# Patient Record
(demographics unavailable — no encounter records)

---

## 2024-10-17 NOTE — PHYSICAL EXAM
[Alert] : alert [Flat Open Anterior [place]] : flat open anterior fontanelle [Conjunctivae with no discharge] : conjunctivae with discharge [Clear Tympanic membranes] : clear tympanic membranes [Tooth Eruption] : tooth eruption [Clear to Auscultation Bilaterally] : clear to auscultation bilaterally [Regular Rate and Rhythm] : regular rate and rhythm [S1, S2 present] : S1, S2 present [Murmurs] : no murmurs [Soft] : soft [Normal External Genitalia] : normal external genitalia [Allis Sign] : negative Allis sign

## 2024-10-17 NOTE — HISTORY OF PRESENT ILLNESS
[Mother] : mother [Formula ___ oz/feed] : [unfilled] oz of formula per feed [Normal] : Normal [Wakes up at night] : wakes up at night [Sippy Cup use] : sippy cup use [de-identified] : Flu#1 [FreeTextEntry1] : 9 m/o F here for well visit.

## 2024-10-17 NOTE — DISCUSSION/SUMMARY
[FreeTextEntry1] : - discussed family's questions and concerns - growth percentiles wnl -  development appropriate for age - can follow up in 3mos for next well visit

## 2024-10-17 NOTE — PHYSICAL EXAM
[Alert] : alert [Flat Open Anterior [place]] : flat open anterior fontanelle [Conjunctivae with no discharge] : conjunctivae with discharge [Clear Tympanic membranes] : clear tympanic membranes [Tooth Eruption] : tooth eruption [Clear to Auscultation Bilaterally] : clear to auscultation bilaterally [Regular Rate and Rhythm] : regular rate and rhythm [S1, S2 present] : S1, S2 present [Murmurs] : no murmurs [Soft] : soft [Normal External Genitalia] : normal external genitalia [Allis Sign] : negative Allis sign

## 2024-10-17 NOTE — HISTORY OF PRESENT ILLNESS
[Mother] : mother [Formula ___ oz/feed] : [unfilled] oz of formula per feed [Normal] : Normal [Wakes up at night] : wakes up at night [Sippy Cup use] : sippy cup use [de-identified] : Flu#1 [FreeTextEntry1] : 9 m/o F here for well visit.

## 2025-01-09 NOTE — DISCUSSION/SUMMARY
[Feeding and Appetite Changes] : feeding and appetite changes [] : The components of the vaccine(s) to be administered today are listed in the plan of care. The disease(s) for which the vaccine(s) are intended to prevent and the risks have been discussed with the caretaker.  The risks are also included in the appropriate vaccination information statements which have been provided to the patient's caregiver.  The caregiver has given consent to vaccinate. [FreeTextEntry1] : - discussed family's questions and concerns - growth percentiles __ - development appropriate for age - Hgb and lead ___ - GoCheck vision screen ___ - can follow up in 3mos for next well visit

## 2025-01-21 NOTE — HISTORY OF PRESENT ILLNESS
[Cow's milk ___ oz/feed] : [unfilled] oz of Cow's milk per feed [Table food] : table food [Normal] : Normal [Mother] : mother [Sippy cup use] : Sippy cup use [de-identified] : mixing Enfamil, whole milk  [de-identified] : MMR, Mery [FreeTextEntry1] : 12 m/o F here for well visit.

## 2025-01-21 NOTE — PHYSICAL EXAM
[Alert] : alert [Normocephalic] : normocephalic [Conjunctivae with no discharge] : conjunctivae with no discharge [Clear Tympanic membranes] : clear tympanic membranes [Tooth Eruption] : tooth eruption [Clear to Auscultation Bilaterally] : clear to auscultation bilaterally [Regular Rate and Rhythm] : regular rate and rhythm [S1, S2 present] : S1, S2 present [Soft] : soft [Normal External Genitalia] : normal external genitalia [Allis Sign] : negative Allis sign [de-identified] : 2/6 systolic murmur

## 2025-01-21 NOTE — PHYSICAL EXAM
[Alert] : alert [Normocephalic] : normocephalic [Conjunctivae with no discharge] : conjunctivae with no discharge [Clear Tympanic membranes] : clear tympanic membranes [Tooth Eruption] : tooth eruption [Clear to Auscultation Bilaterally] : clear to auscultation bilaterally [Regular Rate and Rhythm] : regular rate and rhythm [S1, S2 present] : S1, S2 present [Soft] : soft [Normal External Genitalia] : normal external genitalia [Allis Sign] : negative Allis sign [de-identified] : 2/6 systolic murmur

## 2025-01-21 NOTE — HISTORY OF PRESENT ILLNESS
[Cow's milk ___ oz/feed] : [unfilled] oz of Cow's milk per feed [Table food] : table food [Normal] : Normal [Mother] : mother [Sippy cup use] : Sippy cup use [de-identified] : mixing Enfamil, whole milk  [de-identified] : MMR, Mery [FreeTextEntry1] : 12 m/o F here for well visit.

## 2025-01-21 NOTE — DISCUSSION/SUMMARY
[FreeTextEntry1] : - discussed family's questions and concerns - growth percentiles wnl - development appropriate for age - Hgb and lead wnl - GoCheck vision screen passed - can follow up in 3mos for next well visit

## 2025-04-03 NOTE — DISCUSSION/SUMMARY
[Communication and Social Development] : communication and social development [] : The components of the vaccine(s) to be administered today are listed in the plan of care. The disease(s) for which the vaccine(s) are intended to prevent and the risks have been discussed with the caretaker.  The risks are also included in the appropriate vaccination information statements which have been provided to the patient's caregiver.  The caregiver has given consent to vaccinate. [FreeTextEntry1] : - discussed family's questions and concerns - growth percentiles __ - development appropriate for age - can follow up in 3mos for next well visit

## 2025-05-13 NOTE — PHYSICAL EXAM
[Erythema] : erythema [NL] : regular rate and rhythm, normal S1, S2 audible, no murmurs [Soft] : soft [Conjuctival Injection] : no conjunctival injection [Purulent Effusion] : no purulent effusion [Tender] : nontender [Distended] : nondistended

## 2025-05-13 NOTE — HISTORY OF PRESENT ILLNESS
[FreeTextEntry6] : Fever x 3 days.  Tm 102F today. Only drinking milk.  Fewer wet diapers.  Last got Tylenol at 12pm. Two episodes of vomiting.  Loose stools.  Also tugging at ears.

## 2025-05-30 NOTE — REVIEW OF SYSTEMS
[Nasal Congestion] : nasal congestion [Cough] : cough [Intolerance to feeds] : intolerance to feeds [Vomiting] : vomiting [Diarrhea] : diarrhea [Negative] : Genitourinary

## 2025-05-30 NOTE — PHYSICAL EXAM
[Increased Tearing] : increased tearing [Clear Rhinorrhea] : clear rhinorrhea [Erythematous Oropharynx] : erythematous oropharynx [Clear to Auscultation Bilaterally] : clear to auscultation bilaterally [Soft] : soft [NL] : warm, clear [Tender] : nontender [Distended] : nondistended [FreeTextEntry1] : well hydrated [de-identified] : moist  oral mucosa

## 2025-05-30 NOTE — HISTORY OF PRESENT ILLNESS
[FreeTextEntry6] : fever, intermittent vomiting, cough urinating , + tears  2 days of fever, vomiting since yesterday , vomiting and diarrhea

## 2025-06-03 NOTE — HISTORY OF PRESENT ILLNESS
[FreeTextEntry6] : More than a week of cough and post-tussive emesis.  Always worse after drinking milk.  Low-grade fevers Tm 100F.  Whenever mom stopped milk, cough/vomiting was improved.

## 2025-06-03 NOTE — PHYSICAL EXAM
[NL] : regular rate and rhythm, normal S1, S2 audible, no murmurs [Conjuctival Injection] : no conjunctival injection [FreeTextEntry3] : OM resolved  [FreeTextEntry7] : + grunting; end-expiratory wheeze; crackles in lung fields; no retractions

## 2025-07-29 NOTE — DISCUSSION/SUMMARY
[FreeTextEntry1] : - discussed family's questions and concerns - growth percentiles __ - development appropriate for age - MCHAT screening tool administered; discussed results with family, medium risk on screening but developmentally normal  - can follow up in 6mos for next well visit

## 2025-07-29 NOTE — HISTORY OF PRESENT ILLNESS
[Cow's milk (Ounces per day ___)] : consumes [unfilled] oz of Cow's milk per day [Normal] : Normal [Playtime] : Playtime  [FreeTextEntry7] : missed 15 mo well;  [de-identified] : PCV, Pentacel [FreeTextEntry1] : 18 m/o F here for well visit.

## 2025-07-29 NOTE — HISTORY OF PRESENT ILLNESS
[Cow's milk (Ounces per day ___)] : consumes [unfilled] oz of Cow's milk per day [Normal] : Normal [Playtime] : Playtime  [FreeTextEntry7] : missed 15 mo well;  [de-identified] : PCV, Pentacel [FreeTextEntry1] : 18 m/o F here for well visit.

## 2025-07-29 NOTE — PHYSICAL EXAM
[Alert] : alert [Conjunctivae with no discharge] : conjunctivae with no discharge [Clear Tympanic membranes with present light reflex and bony landmarks] : clear tympanic membranes with present light reflex and bony landmarks [Tooth Eruption] : tooth eruption  [Clear to Auscultation Bilaterally] : clear to auscultation bilaterally [Regular Rate and Rhythm] : regular rate and rhythm [S1, S2 present] : S1, S2 present [Soft] : soft [FreeTextEntry8] : 2/6 systolic murmur